# Patient Record
Sex: MALE | Race: WHITE | HISPANIC OR LATINO | URBAN - METROPOLITAN AREA
[De-identification: names, ages, dates, MRNs, and addresses within clinical notes are randomized per-mention and may not be internally consistent; named-entity substitution may affect disease eponyms.]

---
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
## 2019-05-18 ENCOUNTER — TRANSCRIPTION ENCOUNTER (OUTPATIENT)
Age: 19
End: 2019-05-18

## 2019-05-19 ENCOUNTER — EMERGENCY (EMERGENCY)
Facility: HOSPITAL | Age: 19
LOS: 1 days | Discharge: HOME | End: 2019-05-19
Attending: EMERGENCY MEDICINE | Admitting: EMERGENCY MEDICINE
Payer: MEDICAID

## 2019-05-19 VITALS
SYSTOLIC BLOOD PRESSURE: 118 MMHG | OXYGEN SATURATION: 100 % | HEART RATE: 76 BPM | TEMPERATURE: 99 F | DIASTOLIC BLOOD PRESSURE: 55 MMHG | RESPIRATION RATE: 16 BRPM

## 2019-05-19 PROCEDURE — 99283 EMERGENCY DEPT VISIT LOW MDM: CPT

## 2019-05-19 RX ORDER — ACETAMINOPHEN 500 MG
975 TABLET ORAL ONCE
Refills: 0 | Status: COMPLETED | OUTPATIENT
Start: 2019-05-19 | End: 2019-05-19

## 2019-05-19 RX ADMIN — Medication 975 MILLIGRAM(S): at 22:09

## 2019-05-19 NOTE — ED ADULT NURSE NOTE - OBJECTIVE STATEMENT
pt a&Ox3, states he was playing basket ball when he was elbowed in his bottom lip. pt breathing even & unlabored, 1x1cm laceration noted to inner bottom lip, actively bleeding at this time. pt denies n/v/d, dizziness, cp, sob. MD at bedside, will continue to monitor.

## 2019-05-19 NOTE — ED PROVIDER NOTE - CARE PROVIDER_API CALL
Georgia Booth)  Plastic Surgery; Surgery  [address], [address]  [place] [address]  Phone: [phone]  Fax: [phone]  Follow Up Time: 4-6 Days

## 2019-05-19 NOTE — ED ADULT TRIAGE NOTE - CHIEF COMPLAINT QUOTE
Pt arrives w/ c/o laceration above left eyebrow from elbow while playing basketball 1 hr ago. Unsure of last tetanus shot.

## 2019-05-19 NOTE — ED PROVIDER NOTE - NSFOLLOWUPINSTRUCTIONS_ED_ALL_ED_FT
1. Follow up with Dr. Booth as instructed for reevaluation.  2.  Return to the Emergency Department for worsening, progressive or any other concerning symptoms.
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
## 2020-02-25 NOTE — ED ADULT TRIAGE NOTE - AS O2 DELIVERY
Refill Request on:     Disp Refills Start End    lisdexamfetamine (VYVANSE) 40 MG capsule 30 capsule 0 1/23/2020     Sig - Route: Take 1 capsule by mouth every morning. - Oral    Sent to pharmacy as: Lisdexamfetamine Dimesylate 40 MG Oral Capsule    Class: Eprescribe      Last OV: 2/17/20  Next OV: 9/10/20     Medication pended for provider approval. PDMP checked, refill appropriate.   
room air

## 2021-01-13 NOTE — ED ADULT NURSE NOTE - NSIMPLEMENTINTERV_GEN_ALL_ED
Implemented All Universal Safety Interventions:  [place] to call system. Call bell, personal items and telephone within reach. Instruct patient to call for assistance. Room bathroom lighting operational. Non-slip footwear when patient is off stretcher. Physically safe environment: no spills, clutter or unnecessary equipment. Stretcher in lowest position, wheels locked, appropriate side rails in place. MEDICAL/SURGICAL

## 2022-08-23 ENCOUNTER — APPOINTMENT (OUTPATIENT)
Dept: PULMONOLOGY | Facility: CLINIC | Age: 22
End: 2022-08-23

## 2022-08-23 VITALS
HEART RATE: 63 BPM | DIASTOLIC BLOOD PRESSURE: 70 MMHG | OXYGEN SATURATION: 96 % | SYSTOLIC BLOOD PRESSURE: 110 MMHG | TEMPERATURE: 97.7 F | BODY MASS INDEX: 25.48 KG/M2 | HEIGHT: 69 IN | WEIGHT: 172 LBS

## 2022-08-23 DIAGNOSIS — J45.909 UNSPECIFIED ASTHMA, UNCOMPLICATED: ICD-10-CM

## 2022-08-23 DIAGNOSIS — Z78.9 OTHER SPECIFIED HEALTH STATUS: ICD-10-CM

## 2022-08-23 DIAGNOSIS — Z87.09 PERSONAL HISTORY OF OTHER DISEASES OF THE RESPIRATORY SYSTEM: ICD-10-CM

## 2022-08-23 DIAGNOSIS — Z02.1 ENCOUNTER FOR PRE-EMPLOYMENT EXAMINATION: ICD-10-CM

## 2022-08-23 PROCEDURE — 94060 EVALUATION OF WHEEZING: CPT

## 2022-08-23 PROCEDURE — 99203 OFFICE O/P NEW LOW 30 MIN: CPT | Mod: 25

## 2022-08-23 PROCEDURE — 94727 GAS DIL/WSHOT DETER LNG VOL: CPT

## 2022-08-23 PROCEDURE — 94729 DIFFUSING CAPACITY: CPT

## 2022-09-22 PROBLEM — Z02.1 PRE-EMPLOYMENT EXAMINATION: Status: ACTIVE | Noted: 2022-09-22

## 2022-09-22 PROBLEM — J45.909 AIRWAY HYPERREACTIVITY: Status: ACTIVE | Noted: 2022-09-22

## 2022-09-22 PROBLEM — Z78.9 NEVER SMOKED TOBACCO: Status: ACTIVE | Noted: 2022-09-22

## 2022-09-22 PROBLEM — Z87.09 HISTORY OF ASTHMA: Status: RESOLVED | Noted: 2022-09-22 | Resolved: 2022-09-22

## 2022-09-22 RX ORDER — ALBUTEROL SULFATE 90 UG/1
108 (90 BASE) INHALANT RESPIRATORY (INHALATION)
Refills: 0 | Status: ACTIVE | COMMUNITY
Start: 2022-09-22

## 2022-09-22 NOTE — DISCUSSION/SUMMARY
[FreeTextEntry1] : 20 yo male with hx of "asthma" presently stable on PRN albuterol MDI. I reviewed the PFT results with the patient. This a normal study with a bronchodilator response.. He is to continue use of albuterol MDI PRN alone for now. At present there is no pulmonary contraindication for patient joining [facility].

## 2022-09-22 NOTE — HISTORY OF PRESENT ILLNESS
[Never] : never [TextBox_4] : 22 yo male presents for pre employment pulmonary evaluation prior to joining [facility]. The patient has a history of "asthma" since childhood, treated with PRN albuterol MDI. He was treated in the ER, last in 2018 without hospitalization. Presently he feels "OK", with last albuterol use five months ago.He denies cough, SOB or wheezing at present. [TextBox_29] : Denies snoring, daytime somnolence, apneic episodes, AM headaches

## 2023-02-12 ENCOUNTER — EMERGENCY (EMERGENCY)
Facility: HOSPITAL | Age: 23
LOS: 1 days | Discharge: HOME | End: 2023-02-12
Attending: STUDENT IN AN ORGANIZED HEALTH CARE EDUCATION/TRAINING PROGRAM
Payer: MEDICAID

## 2023-02-12 VITALS
HEIGHT: 69 IN | SYSTOLIC BLOOD PRESSURE: 107 MMHG | TEMPERATURE: 98 F | DIASTOLIC BLOOD PRESSURE: 73 MMHG | OXYGEN SATURATION: 97 % | RESPIRATION RATE: 18 BRPM | WEIGHT: 166.89 LBS | HEART RATE: 83 BPM

## 2023-02-12 LAB
FLUAV AG NPH QL: SIGNIFICANT CHANGE UP
FLUBV AG NPH QL: SIGNIFICANT CHANGE UP
SARS-COV-2 RNA SPEC QL NAA+PROBE: SIGNIFICANT CHANGE UP

## 2023-02-12 PROCEDURE — 71046 X-RAY EXAM CHEST 2 VIEWS: CPT

## 2023-02-12 PROCEDURE — 87637 SARSCOV2&INF A&B&RSV AMP PRB: CPT

## 2023-02-12 PROCEDURE — 99284 EMERGENCY DEPT VISIT MOD MDM: CPT

## 2023-02-12 PROCEDURE — 94640 AIRWAY INHALATION TREATMENT: CPT

## 2023-02-12 PROCEDURE — 99283 EMERGENCY DEPT VISIT LOW MDM: CPT | Mod: 25

## 2023-02-12 PROCEDURE — 71046 X-RAY EXAM CHEST 2 VIEWS: CPT | Mod: 26

## 2023-02-12 RX ORDER — ACETAMINOPHEN 500 MG
975 TABLET ORAL ONCE
Refills: 0 | Status: COMPLETED | OUTPATIENT
Start: 2023-02-12 | End: 2023-02-12

## 2023-02-12 RX ORDER — ALBUTEROL 90 UG/1
3 AEROSOL, METERED ORAL
Qty: 1 | Refills: 0
Start: 2023-02-12 | End: 2023-02-16

## 2023-02-12 RX ORDER — IPRATROPIUM/ALBUTEROL SULFATE 18-103MCG
3 AEROSOL WITH ADAPTER (GRAM) INHALATION
Refills: 0 | Status: COMPLETED | OUTPATIENT
Start: 2023-02-12 | End: 2023-02-12

## 2023-02-12 RX ORDER — ALBUTEROL 90 UG/1
4 AEROSOL, METERED ORAL
Qty: 1 | Refills: 0
Start: 2023-02-12

## 2023-02-12 RX ADMIN — Medication 3 MILLILITER(S): at 12:06

## 2023-02-12 RX ADMIN — Medication 3 MILLILITER(S): at 12:07

## 2023-02-12 RX ADMIN — Medication 975 MILLIGRAM(S): at 12:06

## 2023-02-12 RX ADMIN — Medication 975 MILLIGRAM(S): at 13:42

## 2023-02-12 RX ADMIN — Medication 60 MILLIGRAM(S): at 12:06

## 2023-02-12 RX ADMIN — Medication 3 MILLILITER(S): at 12:00

## 2023-02-12 NOTE — ED PROVIDER NOTE - PATIENT PORTAL LINK FT
You can access the FollowMyHealth Patient Portal offered by [facility] by registering at the following website: [email or web address]. By joining [employer]’s FollowMyHealth portal, you will also be able to view your health information using other applications (apps) compatible with our system.

## 2023-02-12 NOTE — ED PROVIDER NOTE - NSFOLLOWUPINSTRUCTIONS_ED_ALL_ED_FT
You may use albuterol nebulizer treatment every 3-4 hours as needed for wheezing and shortness of breath.     Take steroids as prescribed.     Asthma, Adult    Outline of a person's upper body showing the lungs, with close-ups of two airways, one normal and one narrow.   Asthma is a condition that causes swelling and narrowing of the airways. These are the passages that lead from the nose and mouth down into the lungs. When asthma symptoms get worse it is called an asthma attack or flare. This can make it hard to breathe. Asthma flares can range from minor to life-threatening. There is no cure for asthma, but medicines and lifestyle changes can help to control it.      What are the causes?    It is not known exactly what causes asthma, but certain things can cause asthma symptoms to get worse (triggers).    What can trigger an asthma attack?     •Cigarette smoke.      •Mold.      •Dust.      •Your pet's skin flakes (dander).      •Cockroaches.      •Pollen.      •Air pollution (like household , wood smoke, smog, or chemical odors).        What are the signs or symptoms?    •Trouble breathing (shortness of breath).      •Coughing.      •Making high-pitched whistling sounds when you breathe, most often when you breathe out (wheezing).      •Chest tightness.      •Tiredness with little activity.      •Poor exercise tolerance.        How is this treated?    •Controller medicines that help prevent asthma symptoms.      •Fast-acting reliever or rescue medicines. These give short-term relief of asthma symptoms.      •Allergy medicines if your attacks are brought on by allergens.      •Medicines to help control the body's defense (immune) system.      •Staying away from the things that cause asthma attacks.        Follow these instructions at home:    Avoiding triggers in your home     • Do not allow anyone to smoke in your home.      •Limit use of fireplaces and wood stoves.      •Get rid of pests (such as roaches and mice) and their droppings.    •Keep your home clean.  •Clean your floors. Dust regularly. Use cleaning products that do not smell.      •Wash bed sheets and blankets every week in hot water. Dry them in a dryer.      •Have someone vacuum when you are not home.      •Change your heating and air conditioning filters often.        •Use blankets that are made of polyester or cotton.      General instructions     •Take over-the-counter and prescription medicines only as told by your doctor.    • Do not smoke or use any products that contain nicotine or tobacco. If you need help quitting, ask your doctor.  •Stay away from secondhand smoke.        •Avoid doing things outdoors when allergen counts are high and when air quality is low.      •Warm up before you exercise. Take time to cool down after exercise.    •Use a peak flow meter as told by your doctor. A peak flow meter is a tool that measures how well your lungs are working.  •Keep track of the peak flow meter's readings. Write them down.        •Follow your asthma action plan. This is a written plan for taking care of your asthma and treating your attacks.      •Make sure you get all the shots (vaccines) that your doctor recommends. Ask your doctor about a flu shot and a pneumonia shot.      •Keep all follow-up visits.        Contact a doctor if:    •You have wheezing, shortness of breath, or a cough even while taking medicine to prevent attacks.      •The mucus you cough up (sputum) is thicker than usual.      •The mucus you cough up changes from clear or white to yellow, green, gray, or is bloody.    •You have problems from the medicine you are taking, such as:  •A rash.      •Itching.      •Swelling.      •Trouble breathing.        •You need reliever medicines more than 2–3 times a week.      •Your peak flow reading is still at 50–79% of your personal best after following the action plan for 1 hour.      •You have a fever.        Get help right away if:    •You seem to be worse and are not responding to medicine during an asthma attack.      •You are short of breath even at rest.      •You get short of breath when doing very little activity.      •You have trouble eating, drinking, or talking.      •You have chest pain or tightness.      •You have a fast heartbeat.      •Your lips or fingernails start to turn blue.      •You are light-headed or dizzy, or you faint.      •Your peak flow is less than 50% of your personal best.      •You feel too tired to breathe normally.      These symptoms may be an emergency. Get help right away. Call 911.   • Do not wait to see if the symptoms will go away.       • Do not drive yourself to the hospital.         Summary    •Asthma is a long-term (chronic) condition in which the airways get tight and narrow. An asthma attack can make it hard to breathe.      •Asthma cannot be cured, but medicines and lifestyle changes can help control it.      •Make sure you understand how to avoid triggers and how and when to use your medicines.      •Avoid things that can cause allergy symptoms (allergens). These include animal skin flakes (dander) and pollen from trees or grass.      •Avoid things that pollute the air. These may include household , wood smoke, smog, or chemical odors.

## 2023-02-12 NOTE — ED PROVIDER NOTE - CLINICAL SUMMARY MEDICAL DECISION MAKING FREE TEXT BOX
22-year-old male with past medical history of asthma coming in with couple days of cough and shortness of breath–concern for asthma exacerbation.  Will eval for pneumonia.  Improved with treatment.  Follow-up with PCP.  Return precautions were discussed.

## 2023-02-12 NOTE — ED PROVIDER NOTE - OBJECTIVE STATEMENT
22-year-old male with past medical history of asthma presents with couple of days of cough, shortness of breath.  Attempted to use his albuterol at home with minimal relief.  No fevers or chills.  No abdominal pain, nausea, vomiting, diarrhea.